# Patient Record
Sex: FEMALE | Race: WHITE | NOT HISPANIC OR LATINO | Employment: PART TIME | URBAN - METROPOLITAN AREA
[De-identification: names, ages, dates, MRNs, and addresses within clinical notes are randomized per-mention and may not be internally consistent; named-entity substitution may affect disease eponyms.]

---

## 2019-07-31 ENCOUNTER — HOSPITAL ENCOUNTER (EMERGENCY)
Facility: HOSPITAL | Age: 49
Discharge: HOME | End: 2019-07-31
Attending: INTERNAL MEDICINE
Payer: COMMERCIAL

## 2019-07-31 ENCOUNTER — APPOINTMENT (EMERGENCY)
Dept: RADIOLOGY | Facility: HOSPITAL | Age: 49
End: 2019-07-31
Payer: COMMERCIAL

## 2019-07-31 ENCOUNTER — TELEPHONE (OUTPATIENT)
Dept: EMERGENCY DEPT | Facility: HOSPITAL | Age: 49
End: 2019-07-31

## 2019-07-31 VITALS
BODY MASS INDEX: 25.91 KG/M2 | OXYGEN SATURATION: 98 % | TEMPERATURE: 97.9 F | DIASTOLIC BLOOD PRESSURE: 75 MMHG | RESPIRATION RATE: 20 BRPM | HEART RATE: 98 BPM | HEIGHT: 68 IN | WEIGHT: 171 LBS | SYSTOLIC BLOOD PRESSURE: 128 MMHG

## 2019-07-31 DIAGNOSIS — S83.412A SPRAIN OF MEDIAL COLLATERAL LIGAMENT OF LEFT KNEE, INITIAL ENCOUNTER: Primary | ICD-10-CM

## 2019-07-31 PROCEDURE — 96372 THER/PROPH/DIAG INJ SC/IM: CPT

## 2019-07-31 PROCEDURE — 73564 X-RAY EXAM KNEE 4 OR MORE: CPT

## 2019-07-31 PROCEDURE — 99283 EMERGENCY DEPT VISIT LOW MDM: CPT

## 2019-07-31 RX ORDER — IBUPROFEN 600 MG/1
600 TABLET ORAL EVERY 6 HOURS PRN
Qty: 30 TABLET | Refills: 0 | Status: SHIPPED | OUTPATIENT
Start: 2019-07-31

## 2019-07-31 RX ORDER — OXYCODONE HYDROCHLORIDE AND ACETAMINOPHEN 5; 325 MG/1; MG/1
1 TABLET ORAL ONCE
Status: COMPLETED | OUTPATIENT
Start: 2019-07-31 | End: 2019-07-31

## 2019-07-31 RX ORDER — TRAMADOL HYDROCHLORIDE 50 MG/1
50 TABLET ORAL EVERY 4 HOURS
Qty: 15 TABLET | Refills: 0 | Status: SHIPPED | OUTPATIENT
Start: 2019-07-31 | End: 2019-08-03

## 2019-07-31 RX ORDER — KETOROLAC TROMETHAMINE 30 MG/ML
60 INJECTION, SOLUTION INTRAMUSCULAR; INTRAVENOUS ONCE
Status: COMPLETED | OUTPATIENT
Start: 2019-07-31 | End: 2019-07-31

## 2019-07-31 RX ADMIN — OXYCODONE HYDROCHLORIDE AND ACETAMINOPHEN 1 TABLET: 5; 325 TABLET ORAL at 01:48

## 2019-07-31 RX ADMIN — KETOROLAC TROMETHAMINE 60 MG: 30 INJECTION, SOLUTION INTRAMUSCULAR; INTRAVENOUS at 01:48

## 2019-07-31 NOTE — RESULT ENCOUNTER NOTE
Called patient, left message on machine to return phone call to discuss x-ray  Will need to f/u with ortho
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
## 2019-07-31 NOTE — DISCHARGE INSTRUCTIONS
Continue to ice  Use crutches until able to weight bear    Follow up with Orthopedics if symptoms persist

## 2020-01-20 ENCOUNTER — APPOINTMENT (EMERGENCY)
Dept: CT IMAGING | Facility: HOSPITAL | Age: 50
End: 2020-01-20
Payer: OTHER GOVERNMENT

## 2020-01-20 ENCOUNTER — APPOINTMENT (EMERGENCY)
Dept: ULTRASOUND IMAGING | Facility: HOSPITAL | Age: 50
End: 2020-01-20
Payer: OTHER GOVERNMENT

## 2020-01-20 ENCOUNTER — ANESTHESIA EVENT (EMERGENCY)
Dept: PERIOP | Facility: HOSPITAL | Age: 50
End: 2020-01-20
Payer: OTHER GOVERNMENT

## 2020-01-20 ENCOUNTER — HOSPITAL ENCOUNTER (OUTPATIENT)
Facility: HOSPITAL | Age: 50
Setting detail: OUTPATIENT SURGERY
Discharge: HOME | End: 2020-01-21
Attending: EMERGENCY MEDICINE | Admitting: SURGERY
Payer: OTHER GOVERNMENT

## 2020-01-20 ENCOUNTER — ANESTHESIA (EMERGENCY)
Dept: PERIOP | Facility: HOSPITAL | Age: 50
End: 2020-01-20
Payer: OTHER GOVERNMENT

## 2020-01-20 DIAGNOSIS — R10.31 RIGHT LOWER QUADRANT ABDOMINAL PAIN: ICD-10-CM

## 2020-01-20 DIAGNOSIS — K35.80 ACUTE APPENDICITIS, UNSPECIFIED ACUTE APPENDICITIS TYPE: Primary | ICD-10-CM

## 2020-01-20 DIAGNOSIS — K35.80 ACUTE APPENDICITIS: ICD-10-CM

## 2020-01-20 DIAGNOSIS — N83.209 OVARIAN CYST: ICD-10-CM

## 2020-01-20 LAB
ANION GAP SERPL CALCULATED.3IONS-SCNC: 9 MMOL/L (ref 4–13)
B-HCG SERPL-ACNC: 2 MIU/ML
BACTERIA UR QL AUTO: ABNORMAL /HPF
BASOPHILS # BLD AUTO: 0.02 THOUSANDS/ΜL (ref 0–0.1)
BASOPHILS NFR BLD AUTO: 0 % (ref 0–1)
BILIRUB UR QL STRIP: NEGATIVE
BUN SERPL-MCNC: 16 MG/DL (ref 5–25)
CALCIUM SERPL-MCNC: 9.2 MG/DL (ref 8.3–10.1)
CHLORIDE SERPL-SCNC: 104 MMOL/L (ref 100–108)
CLARITY UR: CLEAR
CO2 SERPL-SCNC: 25 MMOL/L (ref 21–32)
COLOR UR: YELLOW
CREAT SERPL-MCNC: 0.94 MG/DL (ref 0.6–1.3)
EOSINOPHIL # BLD AUTO: 0.01 THOUSAND/ΜL (ref 0–0.61)
EOSINOPHIL NFR BLD AUTO: 0 % (ref 0–6)
ERYTHROCYTE [DISTWIDTH] IN BLOOD BY AUTOMATED COUNT: 12.4 % (ref 11.6–15.1)
GFR SERPL CREATININE-BSD FRML MDRD: 70 ML/MIN/1.73SQ M
GLUCOSE SERPL-MCNC: 118 MG/DL (ref 65–140)
GLUCOSE UR STRIP-MCNC: NEGATIVE MG/DL
HCT VFR BLD AUTO: 38.2 % (ref 34.8–46.1)
HGB BLD-MCNC: 12.7 G/DL (ref 11.5–15.4)
HGB UR QL STRIP.AUTO: ABNORMAL
IMM GRANULOCYTES # BLD AUTO: 0.04 THOUSAND/UL (ref 0–0.2)
IMM GRANULOCYTES NFR BLD AUTO: 0 % (ref 0–2)
KETONES UR STRIP-MCNC: ABNORMAL MG/DL
LACTATE SERPL-SCNC: 0.7 MMOL/L (ref 0.5–2)
LACTATE SERPL-SCNC: 1.2 MMOL/L (ref 0.5–2)
LEUKOCYTE ESTERASE UR QL STRIP: NEGATIVE
LYMPHOCYTES # BLD AUTO: 1.25 THOUSANDS/ΜL (ref 0.6–4.47)
LYMPHOCYTES NFR BLD AUTO: 12 % (ref 14–44)
MCH RBC QN AUTO: 29.6 PG (ref 26.8–34.3)
MCHC RBC AUTO-ENTMCNC: 33.2 G/DL (ref 31.4–37.4)
MCV RBC AUTO: 89 FL (ref 82–98)
MONOCYTES # BLD AUTO: 0.52 THOUSAND/ΜL (ref 0.17–1.22)
MONOCYTES NFR BLD AUTO: 5 % (ref 4–12)
NEUTROPHILS # BLD AUTO: 8.65 THOUSANDS/ΜL (ref 1.85–7.62)
NEUTS SEG NFR BLD AUTO: 83 % (ref 43–75)
NITRITE UR QL STRIP: NEGATIVE
NON-SQ EPI CELLS URNS QL MICRO: ABNORMAL /HPF
NRBC BLD AUTO-RTO: 0 /100 WBCS
PH UR STRIP.AUTO: 7.5 [PH]
PLATELET # BLD AUTO: 282 THOUSANDS/UL (ref 149–390)
PMV BLD AUTO: 10.6 FL (ref 8.9–12.7)
POTASSIUM SERPL-SCNC: 3.6 MMOL/L (ref 3.5–5.3)
PROT UR STRIP-MCNC: NEGATIVE MG/DL
RBC # BLD AUTO: 4.29 MILLION/UL (ref 3.81–5.12)
RBC #/AREA URNS AUTO: ABNORMAL /HPF
SODIUM SERPL-SCNC: 138 MMOL/L (ref 136–145)
SP GR UR STRIP.AUTO: <=1.005 (ref 1–1.03)
UROBILINOGEN UR QL STRIP.AUTO: 0.2 E.U./DL
WBC # BLD AUTO: 10.49 THOUSAND/UL (ref 4.31–10.16)
WBC #/AREA URNS AUTO: ABNORMAL /HPF

## 2020-01-20 PROCEDURE — 88304 TISSUE EXAM BY PATHOLOGIST: CPT | Performed by: PATHOLOGY

## 2020-01-20 PROCEDURE — 84702 CHORIONIC GONADOTROPIN TEST: CPT | Performed by: PHYSICIAN ASSISTANT

## 2020-01-20 PROCEDURE — 96375 TX/PRO/DX INJ NEW DRUG ADDON: CPT

## 2020-01-20 PROCEDURE — 96374 THER/PROPH/DIAG INJ IV PUSH: CPT

## 2020-01-20 PROCEDURE — 76830 TRANSVAGINAL US NON-OB: CPT

## 2020-01-20 PROCEDURE — 44970 LAPAROSCOPY APPENDECTOMY: CPT | Performed by: SURGERY

## 2020-01-20 PROCEDURE — 76856 US EXAM PELVIC COMPLETE: CPT

## 2020-01-20 PROCEDURE — 99285 EMERGENCY DEPT VISIT HI MDM: CPT

## 2020-01-20 PROCEDURE — 99283 EMERGENCY DEPT VISIT LOW MDM: CPT | Performed by: PHYSICIAN ASSISTANT

## 2020-01-20 PROCEDURE — 96361 HYDRATE IV INFUSION ADD-ON: CPT

## 2020-01-20 PROCEDURE — 81001 URINALYSIS AUTO W/SCOPE: CPT | Performed by: PHYSICIAN ASSISTANT

## 2020-01-20 PROCEDURE — 74177 CT ABD & PELVIS W/CONTRAST: CPT

## 2020-01-20 PROCEDURE — 83605 ASSAY OF LACTIC ACID: CPT | Performed by: PHYSICIAN ASSISTANT

## 2020-01-20 PROCEDURE — 36415 COLL VENOUS BLD VENIPUNCTURE: CPT | Performed by: PHYSICIAN ASSISTANT

## 2020-01-20 PROCEDURE — 80048 BASIC METABOLIC PNL TOTAL CA: CPT | Performed by: PHYSICIAN ASSISTANT

## 2020-01-20 PROCEDURE — 85025 COMPLETE CBC W/AUTO DIFF WBC: CPT | Performed by: PHYSICIAN ASSISTANT

## 2020-01-20 RX ORDER — PROPOFOL 10 MG/ML
INJECTION, EMULSION INTRAVENOUS AS NEEDED
Status: DISCONTINUED | OUTPATIENT
Start: 2020-01-20 | End: 2020-01-20 | Stop reason: SURG

## 2020-01-20 RX ORDER — HEPARIN SODIUM 5000 [USP'U]/ML
5000 INJECTION, SOLUTION INTRAVENOUS; SUBCUTANEOUS EVERY 8 HOURS SCHEDULED
Status: DISCONTINUED | OUTPATIENT
Start: 2020-01-20 | End: 2020-01-21 | Stop reason: HOSPADM

## 2020-01-20 RX ORDER — HYDROMORPHONE HCL/PF 1 MG/ML
0.5 SYRINGE (ML) INJECTION
Status: DISCONTINUED | OUTPATIENT
Start: 2020-01-20 | End: 2020-01-21 | Stop reason: HOSPADM

## 2020-01-20 RX ORDER — SODIUM CHLORIDE 9 MG/ML
INJECTION, SOLUTION INTRAVENOUS CONTINUOUS PRN
Status: DISCONTINUED | OUTPATIENT
Start: 2020-01-20 | End: 2020-01-20 | Stop reason: SURG

## 2020-01-20 RX ORDER — SUCCINYLCHOLINE/SOD CL,ISO/PF 100 MG/5ML
SYRINGE (ML) INTRAVENOUS AS NEEDED
Status: DISCONTINUED | OUTPATIENT
Start: 2020-01-20 | End: 2020-01-20 | Stop reason: SURG

## 2020-01-20 RX ORDER — DEXAMETHASONE SODIUM PHOSPHATE 10 MG/ML
INJECTION, SOLUTION INTRAMUSCULAR; INTRAVENOUS AS NEEDED
Status: DISCONTINUED | OUTPATIENT
Start: 2020-01-20 | End: 2020-01-20 | Stop reason: SURG

## 2020-01-20 RX ORDER — OXYCODONE HYDROCHLORIDE 10 MG/1
10 TABLET ORAL EVERY 4 HOURS PRN
Status: DISCONTINUED | OUTPATIENT
Start: 2020-01-20 | End: 2020-01-21 | Stop reason: HOSPADM

## 2020-01-20 RX ORDER — OXYCODONE HYDROCHLORIDE 5 MG/1
5 TABLET ORAL EVERY 4 HOURS PRN
Status: DISCONTINUED | OUTPATIENT
Start: 2020-01-20 | End: 2020-01-21 | Stop reason: HOSPADM

## 2020-01-20 RX ORDER — MIDAZOLAM HYDROCHLORIDE 2 MG/2ML
INJECTION, SOLUTION INTRAMUSCULAR; INTRAVENOUS AS NEEDED
Status: DISCONTINUED | OUTPATIENT
Start: 2020-01-20 | End: 2020-01-20 | Stop reason: SURG

## 2020-01-20 RX ORDER — NEOSTIGMINE METHYLSULFATE 1 MG/ML
INJECTION INTRAVENOUS AS NEEDED
Status: DISCONTINUED | OUTPATIENT
Start: 2020-01-20 | End: 2020-01-20 | Stop reason: SURG

## 2020-01-20 RX ORDER — ONDANSETRON 2 MG/ML
4 INJECTION INTRAMUSCULAR; INTRAVENOUS EVERY 4 HOURS PRN
Status: DISCONTINUED | OUTPATIENT
Start: 2020-01-20 | End: 2020-01-21 | Stop reason: HOSPADM

## 2020-01-20 RX ORDER — MORPHINE SULFATE 10 MG/ML
6 INJECTION, SOLUTION INTRAMUSCULAR; INTRAVENOUS ONCE
Status: COMPLETED | OUTPATIENT
Start: 2020-01-20 | End: 2020-01-20

## 2020-01-20 RX ORDER — LANOLIN ALCOHOL/MO/W.PET/CERES
3 CREAM (GRAM) TOPICAL
Status: DISCONTINUED | OUTPATIENT
Start: 2020-01-20 | End: 2020-01-21 | Stop reason: HOSPADM

## 2020-01-20 RX ORDER — ACETAMINOPHEN 325 MG/1
975 TABLET ORAL EVERY 6 HOURS PRN
Status: DISCONTINUED | OUTPATIENT
Start: 2020-01-20 | End: 2020-01-21 | Stop reason: HOSPADM

## 2020-01-20 RX ORDER — HYDROMORPHONE HCL/PF 1 MG/ML
0.5 SYRINGE (ML) INJECTION
Status: DISCONTINUED | OUTPATIENT
Start: 2020-01-20 | End: 2020-01-20 | Stop reason: HOSPADM

## 2020-01-20 RX ORDER — LIDOCAINE HYDROCHLORIDE 10 MG/ML
INJECTION, SOLUTION EPIDURAL; INFILTRATION; INTRACAUDAL; PERINEURAL AS NEEDED
Status: DISCONTINUED | OUTPATIENT
Start: 2020-01-20 | End: 2020-01-20 | Stop reason: SURG

## 2020-01-20 RX ORDER — MAGNESIUM HYDROXIDE 1200 MG/15ML
LIQUID ORAL AS NEEDED
Status: DISCONTINUED | OUTPATIENT
Start: 2020-01-20 | End: 2020-01-20 | Stop reason: HOSPADM

## 2020-01-20 RX ORDER — CEFAZOLIN SODIUM 2 G/50ML
2000 SOLUTION INTRAVENOUS ONCE
Status: COMPLETED | OUTPATIENT
Start: 2020-01-20 | End: 2020-01-20

## 2020-01-20 RX ORDER — ONDANSETRON 2 MG/ML
INJECTION INTRAMUSCULAR; INTRAVENOUS AS NEEDED
Status: DISCONTINUED | OUTPATIENT
Start: 2020-01-20 | End: 2020-01-20 | Stop reason: SURG

## 2020-01-20 RX ORDER — FENTANYL CITRATE/PF 50 MCG/ML
50 SYRINGE (ML) INJECTION
Status: DISCONTINUED | OUTPATIENT
Start: 2020-01-20 | End: 2020-01-20 | Stop reason: HOSPADM

## 2020-01-20 RX ORDER — ONDANSETRON 2 MG/ML
4 INJECTION INTRAMUSCULAR; INTRAVENOUS ONCE
Status: COMPLETED | OUTPATIENT
Start: 2020-01-20 | End: 2020-01-20

## 2020-01-20 RX ORDER — ROCURONIUM BROMIDE 10 MG/ML
INJECTION, SOLUTION INTRAVENOUS AS NEEDED
Status: DISCONTINUED | OUTPATIENT
Start: 2020-01-20 | End: 2020-01-20 | Stop reason: SURG

## 2020-01-20 RX ORDER — CEFAZOLIN SODIUM 2 G/50ML
2000 SOLUTION INTRAVENOUS EVERY 8 HOURS
Status: DISCONTINUED | OUTPATIENT
Start: 2020-01-20 | End: 2020-01-20

## 2020-01-20 RX ORDER — FENTANYL CITRATE 50 UG/ML
INJECTION, SOLUTION INTRAMUSCULAR; INTRAVENOUS AS NEEDED
Status: DISCONTINUED | OUTPATIENT
Start: 2020-01-20 | End: 2020-01-20 | Stop reason: SURG

## 2020-01-20 RX ORDER — BUPIVACAINE HYDROCHLORIDE 2.5 MG/ML
INJECTION, SOLUTION EPIDURAL; INFILTRATION; INTRACAUDAL AS NEEDED
Status: DISCONTINUED | OUTPATIENT
Start: 2020-01-20 | End: 2020-01-20 | Stop reason: HOSPADM

## 2020-01-20 RX ORDER — KETOROLAC TROMETHAMINE 30 MG/ML
INJECTION, SOLUTION INTRAMUSCULAR; INTRAVENOUS AS NEEDED
Status: DISCONTINUED | OUTPATIENT
Start: 2020-01-20 | End: 2020-01-20 | Stop reason: SURG

## 2020-01-20 RX ORDER — GLYCOPYRROLATE 0.2 MG/ML
INJECTION INTRAMUSCULAR; INTRAVENOUS AS NEEDED
Status: DISCONTINUED | OUTPATIENT
Start: 2020-01-20 | End: 2020-01-20 | Stop reason: SURG

## 2020-01-20 RX ADMIN — SODIUM CHLORIDE 1000 ML: 0.9 INJECTION, SOLUTION INTRAVENOUS at 14:56

## 2020-01-20 RX ADMIN — FENTANYL CITRATE 100 MCG: 50 INJECTION INTRAMUSCULAR; INTRAVENOUS at 18:09

## 2020-01-20 RX ADMIN — MORPHINE SULFATE 6 MG: 10 INJECTION INTRAVENOUS at 13:33

## 2020-01-20 RX ADMIN — CEFAZOLIN SODIUM 2000 MG: 2 SOLUTION INTRAVENOUS at 18:02

## 2020-01-20 RX ADMIN — MIDAZOLAM HYDROCHLORIDE 2 MG: 1 INJECTION, SOLUTION INTRAMUSCULAR; INTRAVENOUS at 18:01

## 2020-01-20 RX ADMIN — SODIUM CHLORIDE: 0.9 INJECTION, SOLUTION INTRAVENOUS at 19:03

## 2020-01-20 RX ADMIN — IOHEXOL 100 ML: 350 INJECTION, SOLUTION INTRAVENOUS at 15:43

## 2020-01-20 RX ADMIN — KETOROLAC TROMETHAMINE 15 MG: 30 INJECTION, SOLUTION INTRAMUSCULAR; INTRAVENOUS at 18:46

## 2020-01-20 RX ADMIN — ONDANSETRON 4 MG: 2 INJECTION INTRAMUSCULAR; INTRAVENOUS at 13:33

## 2020-01-20 RX ADMIN — METRONIDAZOLE 500 MG: 500 INJECTION, SOLUTION INTRAVENOUS at 18:13

## 2020-01-20 RX ADMIN — LIDOCAINE HYDROCHLORIDE 50 MG: 10 INJECTION, SOLUTION EPIDURAL; INFILTRATION; INTRACAUDAL; PERINEURAL at 18:09

## 2020-01-20 RX ADMIN — Medication 100 MG: at 18:09

## 2020-01-20 RX ADMIN — ROCURONIUM BROMIDE 30 MG: 10 SOLUTION INTRAVENOUS at 18:16

## 2020-01-20 RX ADMIN — HEPARIN SODIUM 5000 UNITS: 5000 INJECTION INTRAVENOUS; SUBCUTANEOUS at 22:26

## 2020-01-20 RX ADMIN — OXYCODONE HYDROCHLORIDE 5 MG: 5 TABLET ORAL at 22:26

## 2020-01-20 RX ADMIN — DEXAMETHASONE SODIUM PHOSPHATE 4 MG: 10 INJECTION, SOLUTION INTRAMUSCULAR; INTRAVENOUS at 18:09

## 2020-01-20 RX ADMIN — GLYCOPYRROLATE 0.6 MG: 0.2 INJECTION, SOLUTION INTRAMUSCULAR; INTRAVENOUS at 18:52

## 2020-01-20 RX ADMIN — PROPOFOL 200 MG: 10 INJECTION, EMULSION INTRAVENOUS at 18:09

## 2020-01-20 RX ADMIN — SODIUM CHLORIDE: 0.9 INJECTION, SOLUTION INTRAVENOUS at 17:51

## 2020-01-20 RX ADMIN — ONDANSETRON 4 MG: 2 INJECTION INTRAMUSCULAR; INTRAVENOUS at 18:09

## 2020-01-20 RX ADMIN — NEOSTIGMINE METHYLSULFATE 4 MG: 1 INJECTION INTRAVENOUS at 18:52

## 2020-01-20 NOTE — ED PROVIDER NOTES
History  Chief Complaint   Patient presents with    Abdominal Pain     Pt  reports severe right lower quadrant pain that started 45 min ago with vomiting  AS is a 45 y/o female with past medical history significant for right ovarian cyst and DVT who presents to the emergency department for complaint of right lower quadrant abdominal pain beginning 1 hour ago  Patient states that she has felt this type of pain before but never to this severity  She describes pain as extremely sharp, localizes pain to right lower quadrant, rates pain 10+/10, constant since start worsening, did not take anything for the pain, not made better or worse by anything in particular, denies radiation to back, associated with nausea and vomiting  Per records, patient was found to have a 7 cm right adnexal cyst, believed to be a simple cyst, with planned diagnostic laparoscopy, pelvic washings, right ovarian cystectomy, possible right oophorectomy, and possible right salpingectomy on 2/3/19  She denies a history of ovarian torsion, previous ovarian cyst, ectopic pregnancy, endometriosis, PID, or STD infection  She further denies fever, chills, malaise, increased weakness or fatigue, decreased appetite, diarrhea, change in bowel or bladder habits  She still has her appendix and gallbladder, no history of abdominal surgeries  She is no longer menstruating  She denies vaginal bleeding or foul-smelling discharge  Prior to Admission Medications   Prescriptions Last Dose Informant Patient Reported? Taking?    apixaban (ELIQUIS) 5 mg   Yes Yes   Sig: Take 5 mg by mouth 2 (two) times a day   ibuprofen (MOTRIN) 600 mg tablet   No No   Sig: Take 1 tablet (600 mg total) by mouth every 6 (six) hours as needed for mild pain or moderate pain for up to 20 doses      Facility-Administered Medications: None       Past Medical History:   Diagnosis Date    Ovarian cyst        Past Surgical History:   Procedure Laterality Date    VASCULAR SURGERY         History reviewed  No pertinent family history  I have reviewed and agree with the history as documented  Social History     Tobacco Use    Smoking status: Never Smoker    Smokeless tobacco: Never Used   Substance Use Topics    Alcohol use: No    Drug use: No        Review of Systems   Constitutional: Negative for activity change, appetite change, chills, fatigue and fever  Respiratory: Negative for cough, choking, chest tightness, shortness of breath, wheezing and stridor  Cardiovascular: Negative for chest pain, palpitations and leg swelling  Gastrointestinal: Positive for abdominal pain and nausea  Negative for abdominal distention, blood in stool, constipation, diarrhea and vomiting  Genitourinary: Negative for decreased urine volume, difficulty urinating, dyspareunia, dysuria, flank pain, frequency, genital sores, hematuria, pelvic pain, urgency, vaginal bleeding, vaginal discharge and vaginal pain  Musculoskeletal: Negative for arthralgias, back pain, gait problem, joint swelling, myalgias, neck pain and neck stiffness  Skin: Negative for color change, pallor, rash and wound  Allergic/Immunologic: Negative for immunocompromised state  Neurological: Negative for dizziness, tremors, seizures, syncope, weakness, light-headedness, numbness and headaches  Hematological: Negative for adenopathy  All other systems reviewed and are negative  Physical Exam  Physical Exam   Constitutional: She is oriented to person, place, and time  She appears well-developed and well-nourished  She is cooperative  Non-toxic appearance  No distress  Patient curled in left fetal position, clutching abdomen due to pain   HENT:   Head: Normocephalic and atraumatic  Mouth/Throat: Oropharynx is clear and moist    Eyes: Pupils are equal, round, and reactive to light  Conjunctivae and EOM are normal    Neck: Normal range of motion  Neck supple     Cardiovascular: Normal rate, regular rhythm, S1 normal, S2 normal, normal heart sounds and intact distal pulses  No murmur heard  Pulmonary/Chest: Effort normal and breath sounds normal  No tachypnea  No respiratory distress  Abdominal: Soft  Normal appearance and normal aorta  She exhibits no distension, no ascites, no pulsatile midline mass and no mass  Bowel sounds are decreased  There is no hepatosplenomegaly  There is tenderness in the right lower quadrant and left lower quadrant  There is rebound and guarding  There is no rigidity and no CVA tenderness  No hernia  Lymphadenopathy:        Right: No inguinal adenopathy present  Left: No inguinal adenopathy present  Neurological: She is alert and oriented to person, place, and time  She has normal strength  GCS eye subscore is 4  GCS verbal subscore is 5  GCS motor subscore is 6  Skin: Skin is warm, dry and intact  Capillary refill takes less than 2 seconds  No abrasion, no bruising, no lesion, no petechiae and no rash noted  No erythema  Vitals reviewed        Vital Signs  ED Triage Vitals   Temperature Pulse Respirations Blood Pressure SpO2   01/20/20 1254 01/20/20 1254 01/20/20 1254 01/20/20 1256 01/20/20 1254   97 9 °F (36 6 °C) 67 18 150/67 99 %      Temp Source Heart Rate Source Patient Position - Orthostatic VS BP Location FiO2 (%)   01/20/20 1254 01/20/20 1254 01/20/20 1254 01/20/20 1254 --   Oral Monitor Lying Right arm       Pain Score       01/20/20 1254       Worst Possible Pain           Vitals:    01/20/20 1254 01/20/20 1256 01/20/20 1643 01/20/20 1645   BP:  150/67 143/64 143/64   Pulse: 67  89    Patient Position - Orthostatic VS: Lying Lying Lying          Visual Acuity      ED Medications  Medications   ceFAZolin (ANCEF) IVPB (premix) 2,000 mg (has no administration in time range)   metroNIDAZOLE (FLAGYL) IVPB (premix) 500 mg (has no administration in time range)   morphine (PF) 10 mg/mL injection 6 mg (6 mg Intravenous Given 1/20/20 1333)   ondansetron (ZOFRAN) injection 4 mg (4 mg Intravenous Given 1/20/20 1333)   sodium chloride 0 9 % bolus 1,000 mL (1,000 mL Intravenous New Bag 1/20/20 1456)   iohexol (OMNIPAQUE) 350 MG/ML injection (MULTI-DOSE) 100 mL (100 mL Intravenous Given 1/20/20 1543)       Diagnostic Studies  Results Reviewed     Procedure Component Value Units Date/Time    Urine Microscopic [[id number]]  (Abnormal) Collected:  01/20/20 1643    Lab Status:  Final result Specimen:  Urine, Clean Catch Updated:  01/20/20 1722     RBC, UA 4-10 /hpf      WBC, UA 0-5 /hpf      Epithelial Cells Occasional /hpf      Bacteria, UA Occasional /hpf     Lactic acid, plasma x2 [[id number]]  (Normal) Collected:  01/20/20 1624    Lab Status:  Final result Specimen:  Blood from Arm, Left Updated:  01/20/20 1720     LACTIC ACID 0 7 mmol/L     Narrative:       Result may be elevated if tourniquet was used during collection      UA w Reflex to Microscopic w Reflex to Culture [[id number]]  (Abnormal) Collected:  01/20/20 1643    Lab Status:  Final result Specimen:  Urine, Clean Catch Updated:  01/20/20 1703     Color, UA Yellow     Clarity, UA Clear     Specific Gravity, UA <=1 005     pH, UA 7 5     Leukocytes, UA Negative     Nitrite, UA Negative     Protein, UA Negative mg/dl      Glucose, UA Negative mg/dl      Ketones, UA Trace mg/dl      Urobilinogen, UA 0 2 E U /dl      Bilirubin, UA Negative     Blood, UA Small    Basic metabolic panel [[id number]] Collected:  01/20/20 1419    Lab Status:  Final result Specimen:  Blood from Arm, Left Updated:  01/20/20 1455     Sodium 138 mmol/L      Potassium 3 6 mmol/L      Chloride 104 mmol/L      CO2 25 mmol/L      ANION GAP 9 mmol/L      BUN 16 mg/dL      Creatinine 0 94 mg/dL      Glucose 118 mg/dL      Calcium 9 2 mg/dL      eGFR 70 ml/min/1 73sq m     Narrative:       [place] guidelines for Chronic Kidney Disease (CKD):     Stage 1 with normal or high GFR (GFR > 90 mL/min/1 73 square meters)    Stage 2 Mild CKD (GFR = 60-89 mL/min/1 73 square meters)    Stage 3A Moderate CKD (GFR = 45-59 mL/min/1 73 square meters)    Stage 3B Moderate CKD (GFR = 30-44 mL/min/1 73 square meters)    Stage 4 Severe CKD (GFR = 15-29 mL/min/1 73 square meters)    Stage 5 End Stage CKD (GFR <15 mL/min/1 73 square meters)  Note: GFR calculation is accurate only with a steady state creatinine    Quantitative hCG [[id number]]  (Normal) Collected:  01/20/20 1419    Lab Status:  Final result Specimen:  Blood from Arm, Left Updated:  01/20/20 1455     HCG, Quant 2 mIU/mL     Narrative:        Expected Ranges:     Approximate               Approximate HCG  Gestation age          Concentration ( mIU/mL)  _____________          ______________________   Shellia Leys                      HCG values  0 2-1                       5-50  1-2                           2-3                         100-5000  3-4                         500-75740  4-5                         1000-36286  5-6                         [id number]  6-8                         53633-546324  8-12                        30957-779977      Lactic acid, plasma x2 [[id number]]  (Normal) Collected:  01/20/20 1330    Lab Status:  Final result Specimen:  Blood from Arm, Left Updated:  01/20/20 1415     LACTIC ACID 1 2 mmol/L     Narrative:       Result may be elevated if tourniquet was used during collection      CBC and differential [[id number]]  (Abnormal) Collected:  01/20/20 1330    Lab Status:  Final result Specimen:  Blood from Arm, Left Updated:  01/20/20 1355     WBC 10 49 Thousand/uL      RBC 4 29 Million/uL      Hemoglobin 12 7 g/dL      Hematocrit 38 2 %      MCV 89 fL      MCH 29 6 pg      MCHC 33 2 g/dL      RDW 12 4 %      MPV 10 6 fL      Platelets 325 Thousands/uL      nRBC 0 /100 WBCs      Neutrophils Relative 83 %      Immat GRANS % 0 %      Lymphocytes Relative 12 %      Monocytes Relative 5 %      Eosinophils Relative 0 %      Basophils Relative 0 %      Neutrophils Absolute 8 65 Thousands/µL      Immature Grans Absolute 0 04 Thousand/uL      Lymphocytes Absolute 1 25 Thousands/µL      Monocytes Absolute 0 52 Thousand/µL      Eosinophils Absolute 0 01 Thousand/µL      Basophils Absolute 0 02 Thousands/µL                  CT abdomen pelvis with contrast   Final Result by Corrina Judd MD (01/20 4099)      1  Enlarged appendix with mucosal enhancement and appendicolith, most compatible with acute appendicitis  No evidence of rupture  2  There is a large posterior midline pelvic cyst as noted on pelvic ultrasound performed earlier today  This is felt to be arising from the right ovary  3  The appendix is located along the superior margin of the right ovary  The right ovary is mildly enlarged, however there is felt to be enhancement of the intraovarian tissue making torsion unlikely          I personally discussed this study with Lenin Kulkarni on 1/20/2020 at 4:39 PM                       Workstation performed: [id number]         US pelvis complete w transvaginal   Final Result by Cherie Mcdonald MD (01/20 6016)       Large simple appearing right ovary cyst without evidence of ovarian torsion at this time  Patient is reportedly already scheduled for cystectomy at another institution  No acute abnormality detected  Workstation performed: [id number]                    Procedures  Procedures         ED Course  ED Course as of Jan 20 1728   Mon Jan 20, 2020   1440 CT shows cyst without hemorrhage and no ovarian torsion  Will proceed to CT to r/o appendicitis  1643 Positive appendicitis on CT scan  Surgery consulted                                    MDM  Number of Diagnoses or Management Options  Acute appendicitis:   Ovarian cyst:   Right lower quadrant abdominal pain:   Diagnosis management comments: 45 y/o female with past medical history significant for right adnexal cyst and DVT who presents for complaint of right lower quadrant abdominal pain beginning 1 hour ago  On exam, patient in mild distress due to pain, curled and left fetal position clutching abdomen, nontoxic appearance, afebrile, VSS, AAox3, TTP in the RLQ and LLQ with rebound and voluntary guarding, no other peritoneal signs, no flank pain, no inguinal lymphadenopathy, no skin color changes or bruising, exam otherwise unremarkable  Strong suspicion for ovarian torsion due to known large right adnexal cyst and presentation of pain and symptoms  Also consider acute appendicitis, ruptured ovarian cyst, PID, bowel obstruction  Will obtain:  CBC, CMP, UA, US pelvis  Will proceed to CT abd and pelvis is US unremarkable  Will administer morphine for pain and Zofran for nausea  Amount and/or Complexity of Data Reviewed  Clinical lab tests: ordered and reviewed  Tests in the radiology section of CPT®: ordered and reviewed  Discussion of test results with the performing providers: yes  Decide to obtain previous medical records or to obtain history from someone other than the patient: yes  Obtain history from someone other than the patient: yes  Review and summarize past medical records: yes  Discuss the patient with other providers: yes  Independent visualization of images, tracings, or specimens: yes    Risk of Complications, Morbidity, and/or Mortality  General comments: See ED course note for dispo and plan  I reviewed and discussed all lab and imaging findings with the patient at bedside  I answered any and all questions the patient had regarding emergency department course of evaluation and treatment  The patient verbalized understanding of and agreement with plan        Patient Progress  Patient progress: stable        Disposition  Final diagnoses:   Acute appendicitis   Ovarian cyst   Right lower quadrant abdominal pain     Time reflects when diagnosis was documented in both MDM as applicable and the Disposition within this note     Time User Action Codes Description Comment    1/20/2020  5:05 PM [place], [place] Add [K35 80] Acute appendicitis, unspecified acute appendicitis type     1/20/2020  5:27 PM Aranzaia Jessenia Add [K35 80] Acute appendicitis     1/20/2020  5:27 PM Malissa Alfredo [U81 443] Ovarian cyst     1/20/2020  5:27 PM Aranzaia Jessenia Add [R10 31] Right lower quadrant abdominal pain       ED Disposition     ED Disposition Condition Date/Time Comment    Send to OR  Mon Jan 20, 2020  5:27 PM Patient sent to OR with Dr Dean Gibbons  Follow-up Information    None         Patient's Medications   Discharge Prescriptions    No medications on file     No discharge procedures on file      ED Provider  Electronically Signed by           Adele Matos PA-C  01/20/20 2016

## 2020-01-20 NOTE — H&P
History and Physical -General Surgery  Mar Reid Factor 44 y o  female MRN: [id number]  Unit/Bed#: ED 06 Encounter: [id number]        Reason for Consult / Principal Problem: abdominal pain    HPI: Fiona Moya is a 44y o  year old female who presents with one day of abdominal pain  She had associated nausea/vomiting earlier today  Denies change in bowel habits  She has history of ovarian cyst that is followed by GYN  She is not currently on Eliquis  Her pain is is the right lower quadrant and is persistent in nature  She is scheduled for diagnostic lap for ovarian cyst in February  Review of Systems   Constitutional: Positive for appetite change  HENT: Negative  Eyes: Negative  Respiratory: Negative  Cardiovascular: Negative  Gastrointestinal: Positive for abdominal pain, nausea and vomiting  Negative for abdominal distention, anal bleeding, blood in stool, constipation, diarrhea and rectal pain  Endocrine: Negative  Genitourinary: Negative  Musculoskeletal: Negative  Skin: Negative  Allergic/Immunologic: Negative  Neurological: Negative  Hematological: Negative  Psychiatric/Behavioral: Negative  Historical Information   Past Medical History:   Diagnosis Date    Ovarian cyst      Past Surgical History:   Procedure Laterality Date    VASCULAR SURGERY       Social History   Social History     Substance and Sexual Activity   Alcohol Use No     Social History     Substance and Sexual Activity   Drug Use No     Social History     Tobacco Use   Smoking Status Never Smoker   Smokeless Tobacco Never Used     History reviewed  No pertinent family history  Meds/Allergies       (Not in a hospital admission)  No current facility-administered medications for this encounter  Allergies   Allergen Reactions    Keflet [Cephalexin] Hives       Objective     Blood pressure 143/64, pulse 89, temperature 98 °F (36 7 °C), temperature source Oral, resp   rate 20, weight 77 8 kg (171 lb 8 3 oz), SpO2 98 %  No intake or output data in the 24 hours ending 01/20/20 1701    PHYSICAL EXAM  General appearance: alert and oriented, in no acute distress and alert  Skin: Skin color, texture, turgor normal  No rashes or lesions  Head: Normocephalic, without obvious abnormality, atraumatic  Heart: regular rate and rhythm, S1, S2 normal, no murmur, click, rub or gallop  Lungs: clear to auscultation bilaterally  Abdomen: tenderness to palp RLQ, soft non distended  Back: negative  Rectal: deferred  Neurological: normal without focal findings, mental status, speech normal, alert and oriented x3, KALEB and reflexes normal and symmetric    Lab Results:   Admission on 01/20/2020   Component Date Value    WBC 01/20/2020 10 49*    RBC 01/20/2020 4 29     Hemoglobin 01/20/2020 12 7     Hematocrit 01/20/2020 38 2     MCV 01/20/2020 89     MCH 01/20/2020 29 6     MCHC 01/20/2020 33 2     RDW 01/20/2020 12 4     MPV 01/20/2020 10 6     Platelets 06/19/9072 282     nRBC 01/20/2020 0     Neutrophils Relative 01/20/2020 83*    Immat GRANS % 01/20/2020 0     Lymphocytes Relative 01/20/2020 12*    Monocytes Relative 01/20/2020 5     Eosinophils Relative 01/20/2020 0     Basophils Relative 01/20/2020 0     Neutrophils Absolute 01/20/2020 8 65*    Immature Grans Absolute 01/20/2020 0 04     Lymphocytes Absolute 01/20/2020 1 25     Monocytes Absolute 01/20/2020 0 52     Eosinophils Absolute 01/20/2020 0 01     Basophils Absolute 01/20/2020 0 02     Sodium 01/20/2020 138     Potassium 01/20/2020 3 6     Chloride 01/20/2020 104     CO2 01/20/2020 25     ANION GAP 01/20/2020 9     BUN 01/20/2020 16     Creatinine 01/20/2020 0 94     Glucose 01/20/2020 118     Calcium 01/20/2020 9 2     eGFR 01/20/2020 70     LACTIC ACID 01/20/2020 1 2     HCG, Quant 01/20/2020 2      Imaging Studies: I have personally reviewed pertinent reports         1  Enlarged appendix with mucosal enhancement and appendicolith, most compatible with acute appendicitis  No evidence of rupture  2  There is a large posterior midline pelvic cyst as noted on pelvic ultrasound performed earlier today  This is felt to be arising from the right ovary  3  The appendix is located along the superior margin of the right ovary  The right ovary is mildly enlarged, however there is felt to be enhancement of the intraovarian tissue making torsion unlikely     ASSESSMENT/PLAN:    44year old female with acute appendicitis and right ovarian cyst      -NPO  -ancef/flagyl  -pain control  -OR tonight for lap appy      Counseling / Coordination of Care  Total time spent today  30 minutes  Greater than 50% of total time was spent with the patient and / or family counseling and / or coordination of care

## 2020-01-20 NOTE — ED NOTES
Lactic unsuccessful   Tried by 2 techs Polly Frey and Karlee Kline)      Cheryl SMIHT [facility]  01/20/20 2200

## 2020-01-20 NOTE — ANESTHESIA PREPROCEDURE EVALUATION
Review of Systems/Medical History  Patient summary reviewed  Chart reviewed  No history of anesthetic complications     Cardiovascular  Exercise tolerance (METS): >4,  DVT   Pulmonary  Negative pulmonary ROS        GI/Hepatic  Negative GI/hepatic ROS          Negative  ROS        Endo/Other  Negative endo/other ROS      GYN  Not currently pregnant ,     Comment: Right ovarian cyst      Hematology  Negative hematology ROS      Musculoskeletal  Negative musculoskeletal ROS        Neurology  Negative neurology ROS      Psychology   Negative psychology ROS              Physical Exam    Airway    Mallampati score: III  TM Distance: >3 FB  Neck ROM: full     Dental       Cardiovascular  Rhythm: regular, Rate: normal, Cardiovascular exam normal    Pulmonary  Pulmonary exam normal Breath sounds clear to auscultation,     Other Findings        Anesthesia Plan  ASA Score- 2 Emergent    Anesthesia Type- general with ASA Monitors  Additional Monitors:   Airway Plan: ETT  Plan Factors- Patient instructed to abstain from smoking on day of procedure  Patient did not smoke on day of surgery  Induction- intravenous and rapid sequence induction  Postoperative Plan- Plan for postoperative opioid use  Planned trial extubation    Informed Consent- Anesthetic plan and risks discussed with patient  I personally reviewed this patient with the CRNA  Discussed and agreed on the Anesthesia Plan with the CRNA  Bora Caruso

## 2020-01-21 VITALS
HEART RATE: 100 BPM | DIASTOLIC BLOOD PRESSURE: 56 MMHG | TEMPERATURE: 99.1 F | RESPIRATION RATE: 18 BRPM | HEIGHT: 68 IN | OXYGEN SATURATION: 95 % | BODY MASS INDEX: 25.91 KG/M2 | SYSTOLIC BLOOD PRESSURE: 103 MMHG | WEIGHT: 171 LBS

## 2020-01-21 RX ORDER — OXYCODONE HYDROCHLORIDE 5 MG/1
5 TABLET ORAL EVERY 4 HOURS PRN
Qty: 5 TABLET | Refills: 0 | Status: SHIPPED | OUTPATIENT
Start: 2020-01-21 | End: 2020-01-31

## 2020-01-21 RX ORDER — ACETAMINOPHEN 325 MG/1
975 TABLET ORAL EVERY 8 HOURS PRN
Qty: 30 TABLET | Refills: 0 | Status: SHIPPED | OUTPATIENT
Start: 2020-01-21

## 2020-01-21 RX ADMIN — HEPARIN SODIUM 5000 UNITS: 5000 INJECTION INTRAVENOUS; SUBCUTANEOUS at 05:05

## 2020-01-21 RX ADMIN — OXYCODONE HYDROCHLORIDE 5 MG: 5 TABLET ORAL at 05:05

## 2020-01-21 RX ADMIN — OXYCODONE HYDROCHLORIDE 5 MG: 5 TABLET ORAL at 09:42

## 2020-01-21 NOTE — UTILIZATION REVIEW
Initial Clinical Review    Admission: Date/Time/Statement: 1/20/2020 1725 Outpatient no charge bed  Start   Ordered   01/20/20 1641  Outpatient No Charge Bed Once     Transfer Service: Surgery-General       Question: Admitting Physician Answer: Mannie Nuha    01/20/20 1725       ED Arrival Information     Expected Arrival [facility] Means of Arrival Escorted By Service Admission Type    - 1/20/2020 12:17 Urgent [facility] Surgery-General Urgent    Arrival Complaint    ABD PAIN ([address])        Chief Complaint   Patient presents with    Abdominal Pain     Pt  reports severe right lower quadrant pain that started 45 min ago with vomiting  Assessment/Plan: this is a 44year old female from long term to ED admitted to Outpatient no charge bed due to appendicitis with need for laparoscopic appendectomy  Presented due to right lower quadrant abdominal pain starting about 1 hour ago  On exam, patient curled in fetal position due to pain  Bowel sounds decreased  Tenderness RLQ and LLQ, rebound and guarding  Wbc 10 49  Ct abdomen showed acute appendicitis  Pain control, IV antibiotics and IVF given in ED    Taken to OR      1/20/2020 Procedure - APPENDECTOMY LAPAROSCOPIC    ED Triage Vitals   Temperature Pulse Respirations Blood Pressure SpO2   01/20/20 1254 01/20/20 1254 01/20/20 1254 01/20/20 1256 01/20/20 1254   97 9 °F (36 6 °C) 67 18 150/67 99 %      Temp Source Heart Rate Source Patient Position - Orthostatic VS BP Location FiO2 (%)   01/20/20 1254 01/20/20 1254 01/20/20 1254 01/20/20 1254 --   Oral Monitor Lying Right arm       Pain Score       01/20/20 1254       Worst Possible Pain        Wt Readings from Last 1 Encounters:   01/20/20 77 6 kg (171 lb)     Additional Vital Signs:   01/20/20 1749  100 1 °F (37 8 °C)    19        None (Room air)     01/21/20 0500  99 1 °F (37 3 °C)  100  18  103/56  75  95 %  None (Room air)    Lying   01/21/20 0056  98 6 °F (37 °C)  103  16  120/57  82  96 %  None (Room air)    Lying   01/21/20 0003  98 6 °F (37 °C)  98  16  116/56  80  95 %  None (Room air)    Lying   01/20/20 2254  98 9 °F (37 2 °C)  86  16  119/56    97 %  None (Room air)         Pertinent Labs/Diagnostic Test Results:   1/20/2020 CT abdomen - Enlarged appendix with mucosal enhancement and appendicolith, most compatible with acute appendicitis   No evidence of rupture  2  There is a large posterior midline pelvic cyst as noted on pelvic ultrasound performed earlier today   This is felt to be arising from the right ovary  3  The appendix is located along the superior margin of the right ovary   The right ovary is mildly enlarged, however there is felt to be enhancement of the intraovarian tissue making torsion unlikely    1/20/2020 US pelvis/transvaginal - Large simple appearing right ovary cyst without evidence of ovarian torsion at this time   Patient is reportedly already scheduled for cystectomy at another institution    No acute abnormality detected  Results from last 7 days   Lab Units 01/20/20  1330   WBC Thousand/uL 10 49*   HEMOGLOBIN g/dL 12 7   HEMATOCRIT % 38 2   PLATELETS Thousands/uL 282   NEUTROS ABS Thousands/µL 8 65*     Results from last 7 days   Lab Units 01/20/20  1419   SODIUM mmol/L 138   POTASSIUM mmol/L 3 6   CHLORIDE mmol/L 104   CO2 mmol/L 25   ANION GAP mmol/L 9   BUN mg/dL 16   CREATININE mg/dL 0 94   EGFR ml/min/1 73sq m 70   CALCIUM mg/dL 9 2     Results from last 7 days   Lab Units 01/20/20  1419   GLUCOSE RANDOM mg/dL 118     Results from last 7 days   Lab Units 01/20/20  1624 01/20/20  1330   LACTIC ACID mmol/L 0 7 1 2     Results from last 7 days   Lab Units 01/20/20  1643   CLARITY UA  Clear   COLOR UA  Yellow   SPEC GRAV UA  <=1 005   PH UA  7 5   GLUCOSE UA mg/dl Negative   KETONES UA mg/dl Trace*   BLOOD UA  Small*   PROTEIN UA mg/dl Negative   NITRITE UA  Negative   BILIRUBIN UA  Negative   UROBILINOGEN UA E U /dl 0 2   LEUKOCYTES UA  Negative   WBC UA /hpf 0-5   RBC UA /hpf 4-10*   BACTERIA UA /hpf Occasional   EPITHELIAL CELLS WET PREP /hpf Occasional       ED Treatment:   Medication Administration from 01/20/2020 1216 to 01/20/2020 1739       Date/Time Order Dose Route Action Comments     01/20/2020 1333 morphine (PF) 10 mg/mL injection 6 mg 6 mg Intravenous Given      01/20/2020 1333 ondansetron (ZOFRAN) injection 4 mg 4 mg Intravenous Given      01/20/2020 1456 sodium chloride 0 9 % bolus 1,000 mL 1,000 mL Intravenous New Bag      01/20/2020 1543 iohexol (OMNIPAQUE) 350 MG/ML injection (MULTI-DOSE) 100 mL 100 mL Intravenous Given         Past Medical History:   Diagnosis Date    Ovarian cyst      Present on Admission:  **None**      Admitting Diagnosis: Ovarian cyst [N83 209]  Abdominal pain [R10 9]  Acute appendicitis [K35 80]  Right lower quadrant abdominal pain [R10 31]  Acute appendicitis, unspecified acute appendicitis type [K35 80]  Age/Sex: 44 y o  female  Admission Orders: 1/20/2020 1725 Outpatient no charge bed  Scheduled Medications:  Medications:  heparin (porcine) 5,000 Units Subcutaneous Q8H [address]     Continuous IV Infusions: none     PRN Meds:  acetaminophen 975 mg Oral Q6H PRN   HYDROmorphone 0 5 mg Intravenous Q3H PRN   melatonin 3 mg Oral HS PRN   ondansetron 4 mg Intravenous Q4H PRN   oxyCODONE 10 mg Oral Q4H PRN   oxyCODONE - used x 2  5 mg Oral Q4H PRN         Network Utilization Review Department  [email or web address]  ATTENTION: Please call with any questions or concerns to [phone] and carefully listen to the prompts so that you are directed to the right person  All voicemails are confidential   S[facility] [employer] all requests for admission clinical reviews, approved or denied determinations and any other requests to dedicated fax number below belonging to the campus where the patient is receiving treatment   List of dedicated fax numbers for the Facilities:  FACILITY NAME UR FAX NUMBER   ADMISSION DENIALS (Administrative/Medical Necessity) [address] [facility] (Maternity/NICU/Pediatrics) [place] [phone]   [facility] [phone]   Calin Standing [phone]   [facility] [address] [place] [address] [phone]   [facility]  [phone]   [address] [facility], S   [address] [phone]

## 2020-01-21 NOTE — OP NOTE
OPERATIVE REPORT  PATIENT NAME: Marie Gonzales    :  1970  MRN: [id number]  Pt Location: [facility] OR ROOM 02    SURGERY DATE: 2020    Surgeon(s) and Role:     * Sean Villafana MD - Primary     * Michel Spence MD - Assisting    Preop Diagnosis:  Acute appendicitis, unspecified acute appendicitis type [K35 80]    Post-Op Diagnosis Codes:     * Acute appendicitis, unspecified acute appendicitis type [K35 80]    Procedure(s) (LRB):  APPENDECTOMY LAPAROSCOPIC (N/A)    Specimen(s):  ID Type Source Tests Collected by Time Destination   1 : 2300 [facility] Tissue Appendix TISSUE 166 Morales Jo MD 2020 1829        Estimated Blood Loss:   Minimal    Drains:  * No LDAs found *    Anesthesia Type:   General    Operative Indications:  Acute appendicitis, unspecified acute appendicitis type [K35 80]    Operative Findings:  Acutely inflamed appendix without perforation      Complications:   None    Procedure and Technique:  The patient was identified by name and armband in the preoperative holding area  She was then brought to the operating room, where general anesthesia was induced, and an endotracheal tube was placed by the anesthesiology team  She was placed in the supine position with both arms out  Her abdomen was then prepped and draped in the usual sterile fashion  A brief timeout was called, and all in the room were in agreement  Local anesthetic was used to infiltrate the skin superior to the umbilicus  A vertical supraumbilical incision was made using an 11-blade  The soft tissue was dissected down to the level of the fascia  The fascia was then transected using 11 blade  A 12 mm trocar was then placed under direct visualization  The abdomen was insufflated to a pressure of 15 mmHg  Two additional 5 mm trocars were placed in the suprapubic and infrapubic midline  The appendix was identified in the right lower quadrant and appeared to be free of adhesions or lateral attachments   A grasper was placed in the infraumbilical port and was used to grasp the appendix and retract it superiorly  The meso appendix was dissected off with the Harmonic scalpel  The appendix was transected at the appendiceal base with a linear stapler  The staple line was inspected and the appendix was placed into an Endo-Catch bag  This bag was removed through the supraumbilical trocar site  Fascia was closed using 0 vicryl suture  Skin was closed using monacryl and histacryl  All spong, instrument, and needle counts were correct x1  RF wanding was negative for retained foreign bodies      Dr Gypsy Butcher was present and scrubbed for the entire procedure    Patient Disposition:  PACU     SIGNATURE: Effie Antoine MD  DATE: January 20, 2020  TIME: 7:17 PM

## 2020-01-21 NOTE — PLAN OF CARE
Problem: PAIN - ADULT  Goal: Verbalizes/displays adequate comfort level or baseline comfort level  Description  Interventions:  - Encourage patient to monitor pain and request assistance  - Assess pain using appropriate pain scale  - Administer analgesics based on type and severity of pain and evaluate response  - Implement non-pharmacological measures as appropriate and evaluate response  - Consider cultural and social influences on pain and pain management  - Notify physician/advanced practitioner if interventions unsuccessful or patient reports new pain  Outcome: Progressing     Problem: INFECTION - ADULT  Goal: Absence or prevention of progression during hospitalization  Description  INTERVENTIONS:  - Assess and monitor for signs and symptoms of infection  - Monitor lab/diagnostic results  - Monitor all insertion sites, i e  indwelling lines, tubes, and drains  - [place] appropriate cooling/warming therapies per order  - Administer medications as ordered  - Instruct and encourage patient and family to use good hand hygiene technique  - Identify and instruct in appropriate isolation precautions for identified infection/condition   Outcome: Progressing  Goal: Absence of fever/infection during neutropenic period  Description  INTERVENTIONS:  - Monitor WBC    Outcome: Progressing

## 2020-01-21 NOTE — PROGRESS NOTES
Progress Note -Surgery PA  Mar Barillas 44 y o  female MRN: [id number]  Unit/Bed#: S -01 Encounter: [id number]    ASSESSMENT/PLAN:  Problem List     * (Principal) Acute appendicitis   45 yo F POD 1 s/p laparoscopic appendectomy  Doing well and tolerating diet  No N/V  · OOB   · PO pain control  · IS   · Tentative DC for today  VTE Pharmacologic Prophylaxis: Sequential compression device (Venodyne)  and Heparin    Subjective/Objective     Subjective:    No complaints  Ate a turkey sandwich at 5 am   Denies N/V     Objective/Physical Exam: Blood pressure 103/56, pulse 100, temperature 99 1 °F (37 3 °C), temperature source Oral, resp  rate 18, height 5' 8" (1 727 m), weight 77 6 kg (171 lb), SpO2 95 %  ,Body mass index is 26 kg/m²      General appearance: alert  Abdomen: soft, non-tender; bowel sounds normal; no masses,  no organomegaly and incisions intact      Current Facility-Administered Medications:     acetaminophen (TYLENOL) tablet 975 mg, 975 mg, Oral, Q6H PRN, Jody Hurt MD    heparin (porcine) subcutaneous injection 5,000 Units, 5,000 Units, Subcutaneous, Q8H [address], 5,000 Units at 01/21/20 0505 **AND** Platelet count, , , Once, Jody Hurt MD    HYDROmorphone (DILAUDID) injection 0 5 mg, 0 5 mg, Intravenous, Q3H PRN, Jody Hurt MD    melatonin tablet 3 mg, 3 mg, Oral, HS PRN, Jody Hurt MD    ondansetron [facility]) injection 4 mg, 4 mg, Intravenous, Q4H PRN, Jody Hurt MD    oxyCODONE (ROXICODONE) IR tablet 10 mg, 10 mg, Oral, Q4H PRN, Jody Hurt MD    oxyCODONE (ROXICODONE) IR tablet 5 mg, 5 mg, Oral, Q4H PRN, Jody Hurt MD, 5 mg at 01/21/20 0505

## 2020-01-21 NOTE — PLAN OF CARE
Problem: PAIN - ADULT  Goal: Verbalizes/displays adequate comfort level or baseline comfort level  Description  Interventions:  - Encourage patient to monitor pain and request assistance  - Assess pain using appropriate pain scale  - Administer analgesics based on type and severity of pain and evaluate response  - Implement non-pharmacological measures as appropriate and evaluate response  - Consider cultural and social influences on pain and pain management  - Notify physician/advanced practitioner if interventions unsuccessful or patient reports new pain  Outcome: Progressing     Problem: INFECTION - ADULT  Goal: Absence or prevention of progression during hospitalization  Description  INTERVENTIONS:  - Assess and monitor for signs and symptoms of infection  - Monitor lab/diagnostic results  - Monitor all insertion sites, i e  indwelling lines, tubes, and drains  - [place] appropriate cooling/warming therapies per order  - Administer medications as ordered  - Instruct and encourage patient and family to use good hand hygiene technique  - Identify and instruct in appropriate isolation precautions for identified infection/condition   Outcome: Progressing  Goal: Absence of fever/infection during neutropenic period  Description  INTERVENTIONS:  - Monitor WBC    Outcome: Progressing

## 2020-01-21 NOTE — ANESTHESIA POSTPROCEDURE EVALUATION
Post-Op Assessment Note    CV Status:  Stable  Pain Score: 0    Pain management: adequate     Mental Status:  Alert and awake   Hydration Status:  Euvolemic   PONV Controlled:  Controlled   Airway Patency:  Patent   Post Op Vitals Reviewed: Yes      Staff: CRNA           BP   137/77   Temp      Pulse  79   Resp   16   SpO2   96%

## 2020-01-21 NOTE — PLAN OF CARE
Problem: PAIN - ADULT  Goal: Verbalizes/displays adequate comfort level or baseline comfort level  Description  Interventions:  - Encourage patient to monitor pain and request assistance  - Assess pain using appropriate pain scale  - Administer analgesics based on type and severity of pain and evaluate response  - Implement non-pharmacological measures as appropriate and evaluate response  - Consider cultural and social influences on pain and pain management  - Notify physician/advanced practitioner if interventions unsuccessful or patient reports new pain  Outcome: Progressing     Problem: INFECTION - ADULT  Goal: Absence or prevention of progression during hospitalization  Description  INTERVENTIONS:  - Assess and monitor for signs and symptoms of infection  - Monitor lab/diagnostic results  - Monitor all insertion sites, i e  indwelling lines, tubes, and drains  - [place] appropriate cooling/warming therapies per order  - Administer medications as ordered  - Instruct and encourage patient and family to use good hand hygiene technique  - Identify and instruct in appropriate isolation precautions for identified infection/condition   Outcome: Progressing  Goal: Absence of fever/infection during neutropenic period  Description  INTERVENTIONS:  - Monitor WBC    Outcome: Progressing

## 2020-02-10 PROBLEM — K35.80 ACUTE APPENDICITIS: Status: RESOLVED | Noted: 2020-01-20 | Resolved: 2020-02-10

## 2024-05-11 ENCOUNTER — OFFICE VISIT (OUTPATIENT)
Dept: URGENT CARE | Facility: CLINIC | Age: 54
End: 2024-05-11
Payer: COMMERCIAL

## 2024-05-11 VITALS
OXYGEN SATURATION: 97 % | RESPIRATION RATE: 20 BRPM | HEART RATE: 98 BPM | TEMPERATURE: 97.6 F | SYSTOLIC BLOOD PRESSURE: 112 MMHG | DIASTOLIC BLOOD PRESSURE: 60 MMHG

## 2024-05-11 DIAGNOSIS — J06.9 VIRAL URI WITH COUGH: Primary | ICD-10-CM

## 2024-05-11 PROCEDURE — 99213 OFFICE O/P EST LOW 20 MIN: CPT | Performed by: PHYSICIAN ASSISTANT

## 2024-05-11 RX ORDER — SERTRALINE HYDROCHLORIDE 25 MG/1
TABLET, FILM COATED ORAL
COMMUNITY
Start: 2024-04-09

## 2024-05-11 NOTE — PATIENT INSTRUCTIONS
Discussed symptoms are most likely viral in nature.  To continue supportive care.  Work note provided.  All patient's questions answered.

## 2024-05-11 NOTE — LETTER
May 11, 2024     Patient: Mar Barillas   YOB: 1970   Date of Visit: 5/11/2024       To Whom It May Concern:    It is my medical opinion that Mar Barillas may return to work on 05/13/2024 .    If you have any questions or concerns, please don't hesitate to call.         Sincerely,        LENNOX PAN    CC: No Recipients

## 2024-05-11 NOTE — PROGRESS NOTES
[facility]        NAME: Mar Barillas is a 53 y.o. female  : 1970    MRN: [id number]  DATE: May 11, 2024  TIME: 11:36 AM    Assessment and Plan   Viral URI with cough [J06.9]  1. Viral URI with cough              Patient Instructions     Patient Instructions   Discussed symptoms are most likely viral in nature.  To continue supportive care.  Work note provided.  All patient's questions answered.      Follow up with PCP in 3-5 days.  Proceed to  ER if symptoms worsen.    Chief Complaint     Chief Complaint   Patient presents with    Cough     3 days    Fatigue         History of Present Illness       Patient is a 53-year-old female presenting today with cold-like symptoms x 3 days.  Patient notes over the last few days she has had congestion, cough and felt fatigued, has not taken any medication alleviating factors for her symptoms.  Notes a few individuals at her work have been experiencing similar symptoms over the last few days.  Denies fever, chills, chest tightness, SOB, hemoptysis.  Patient denies tobacco use but does smoke marijuana on a daily basis.        Review of Systems   Review of Systems   Constitutional:  Positive for fatigue. Negative for chills and fever.   HENT:  Positive for congestion. Negative for postnasal drip and sore throat.    Eyes:  Negative for redness and itching.   Respiratory:  Positive for cough. Negative for chest tightness and shortness of breath.    Cardiovascular:  Negative for chest pain.   Gastrointestinal:  Negative for diarrhea, nausea and vomiting.   Musculoskeletal:  Negative for arthralgias and myalgias.   Neurological:  Negative for light-headedness and headaches.         Current Medications       Current Outpatient Medications:     acetaminophen (TYLENOL) 325 mg tablet, Take 3 tablets (975 mg total) by mouth every 8 (eight) hours as needed for mild pain, Disp: 30 tablet, Rfl: 0    ibuprofen (MOTRIN) 600 mg tablet, Take 1 tablet (600 mg total) by mouth every 6  (six) hours as needed for mild pain or moderate pain for up to 20 doses, Disp: 30 tablet, Rfl: 0    sertraline (ZOLOFT) 25 mg tablet, , Disp: , Rfl:     sertraline (ZOLOFT) 50 mg tablet, , Disp: , Rfl:     Current Allergies     Allergies as of 05/11/2024 - Reviewed 05/11/2024   Allergen Reaction Noted    Clindamycin Itching 07/02/2010    Keflet [cephalexin] Hives 03/19/2016            The following portions of the patient's history were reviewed and updated as appropriate: allergies, current medications, past family history, past medical history, past social history, past surgical history and problem list.     Past Medical History:   Diagnosis Date    Ovarian cyst        Past Surgical History:   Procedure Laterality Date    UT LAPAROSCOPIC APPENDECTOMY N/A 1/20/2020    Procedure: APPENDECTOMY LAPAROSCOPIC;  Surgeon: Matt Bernardo MD;  Location: [facility];  Service: General    VASCULAR SURGERY         History reviewed. No pertinent family history.      Medications have been verified.        Objective   /60   Pulse 98   Temp 97.6 °F (36.4 °C)   Resp 20   SpO2 97%        Physical Exam     Physical Exam  Vitals and nursing note reviewed.   Constitutional:       General: She is not in acute distress.     Appearance: Normal appearance. She is not ill-appearing.   HENT:      Head: Normocephalic.      Right Ear: Tympanic membrane, ear canal and external ear normal.      Left Ear: Tympanic membrane, ear canal and external ear normal.      Nose: Congestion present.      Mouth/Throat:      Mouth: Mucous membranes are moist.      Pharynx: Oropharynx is clear.   Eyes:      Conjunctiva/sclera: Conjunctivae normal.   Cardiovascular:      Rate and Rhythm: Normal rate and regular rhythm.      Pulses: Normal pulses.      Heart sounds: Normal heart sounds.   Pulmonary:      Effort: Pulmonary effort is normal.      Breath sounds: Normal breath sounds.   Skin:     General: Skin is warm.      Capillary Refill: Capillary refill  takes less than 2 seconds.   Neurological:      Mental Status: She is alert.

## (undated) DEVICE — IRRIG ENDO FLO TUBING

## (undated) DEVICE — DRAPE EQUIPMENT RF WAND

## (undated) DEVICE — ALLENTOWN LAP CHOLE APP PACK: Brand: CARDINAL HEALTH

## (undated) DEVICE — LIGHT HANDLE COVER SLEEVE DISP BLUE STELLAR

## (undated) DEVICE — CHLORAPREP HI-LITE 26ML ORANGE

## (undated) DEVICE — STERILE SURGICAL LUBRICANT,  TUBE: Brand: SURGILUBE

## (undated) DEVICE — HARMONIC 1100 SHEARS, 36CM SHAFT LENGTH: Brand: HARMONIC

## (undated) DEVICE — VIAL DECANTER

## (undated) DEVICE — INTENDED FOR TISSUE SEPARATION, AND OTHER PROCEDURES THAT REQUIRE A SHARP SURGICAL BLADE TO PUNCTURE OR CUT.: Brand: BARD-PARKER SAFETY BLADES SIZE 11, STERILE

## (undated) DEVICE — UNDYED BRAIDED (POLYGLACTIN 910), SYNTHETIC ABSORBABLE SUTURE: Brand: COATED VICRYL

## (undated) DEVICE — ETS45 RELOAD STANDARD 45MM: Brand: ENDOPATH

## (undated) DEVICE — GLOVE SRG BIOGEL ECLIPSE 7

## (undated) DEVICE — TROCAR APPPLE 5MM EXTENDED LENGTH

## (undated) DEVICE — TROCAR: Brand: KII FIOS FIRST ENTRY

## (undated) DEVICE — SUT MONOCRYL 4-0 PS-2 27 IN Y426H

## (undated) DEVICE — TISSUE RETRIEVAL SYSTEM: Brand: INZII RETRIEVAL SYSTEM

## (undated) DEVICE — NEEDLE 22 G X 1 1/2 SAFETY

## (undated) DEVICE — ENDOPATH ETS-FLEX45 ARTICULATING ENDOSCOPIC LINEAR CUTTER, NO RELOAD: Brand: ENDOPATH

## (undated) DEVICE — ADHESIVE SKIN HIGH VISCOSITY EXOFIN 1ML